# Patient Record
Sex: FEMALE | Race: WHITE | NOT HISPANIC OR LATINO | Employment: UNEMPLOYED | URBAN - METROPOLITAN AREA
[De-identification: names, ages, dates, MRNs, and addresses within clinical notes are randomized per-mention and may not be internally consistent; named-entity substitution may affect disease eponyms.]

---
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
## 2023-03-15 ENCOUNTER — OFFICE VISIT (OUTPATIENT)
Dept: FAMILY MEDICINE CLINIC | Facility: CLINIC | Age: 18
End: 2023-03-15

## 2023-03-15 VITALS
HEART RATE: 82 BPM | DIASTOLIC BLOOD PRESSURE: 78 MMHG | RESPIRATION RATE: 17 BRPM | WEIGHT: 116 LBS | BODY MASS INDEX: 18.64 KG/M2 | SYSTOLIC BLOOD PRESSURE: 116 MMHG | HEIGHT: 66 IN | OXYGEN SATURATION: 98 % | TEMPERATURE: 98.5 F

## 2023-03-15 DIAGNOSIS — H61.23 BILATERAL IMPACTED CERUMEN: Primary | ICD-10-CM

## 2023-03-15 NOTE — PROGRESS NOTES
Assessment/Plan:     Diagnoses and all orders for this visit:    Bilateral impacted cerumen    Other orders  -     Ear cerumen removal          Both cerumen impactions were removed without any complications however with great difficulty due to the narrow ear canals as well as very firm and hard earwax  Ear cleaning techniques discussed recommend using drops at least twice a month  We will follow-up as needed      Subjective:     Chief Complaint   Patient presents with   • Ear lavage        Patient ID: Alicia Resendez is a 21 y o  female  Patient presents today for significant cerumen impactions  Patient has a long history of earwax issues  Is noticing significant hearing loss  Otherwise no other complaints      The following portions of the patient's history were reviewed and updated as appropriate: allergies, current medications, past family history, past medical history, past social history, past surgical history and problem list     Review of Systems   Constitutional: Negative  HENT: Negative  Eyes: Negative  Respiratory: Negative  Cardiovascular: Negative  Gastrointestinal: Negative  Endocrine: Negative  Genitourinary: Negative  Musculoskeletal: Negative  Skin: Negative  Allergic/Immunologic: Negative  Neurological: Negative  Hematological: Negative  Psychiatric/Behavioral: Negative  All other systems reviewed and are negative  Objective:    Vitals:    03/15/23 1430   BP: 116/78   BP Location: Left arm   Patient Position: Sitting   Cuff Size: Standard   Pulse: 82   Resp: 17   Temp: 98 5 °F (36 9 °C)   TempSrc: Tympanic   SpO2: 98%   Weight: 52 6 kg (116 lb)   Height: 5' 6 4" (1 687 m)          Physical Exam  Vitals and nursing note reviewed  Constitutional:       Appearance: She is well-developed  HENT:      Head: Normocephalic and atraumatic        Right Ear: External ear normal       Left Ear: External ear normal       Ears:      Comments: Bilateral cerumen impactions  Eyes:      Conjunctiva/sclera: Conjunctivae normal       Pupils: Pupils are equal, round, and reactive to light  Cardiovascular:      Rate and Rhythm: Normal rate and regular rhythm  Heart sounds: Normal heart sounds  Pulmonary:      Effort: Pulmonary effort is normal       Breath sounds: Normal breath sounds  Abdominal:      General: Bowel sounds are normal       Palpations: Abdomen is soft  Musculoskeletal:         General: Normal range of motion  Cervical back: Normal range of motion  Skin:     General: Skin is warm and dry  Neurological:      Mental Status: She is alert and oriented to person, place, and time  Deep Tendon Reflexes: Reflexes are normal and symmetric  Psychiatric:         Behavior: Behavior normal          Thought Content: Thought content normal          Judgment: Judgment normal          Ear cerumen removal    Date/Time: 3/15/2023 4:15 PM  Performed by: aCrli Hickey DO  Authorized by: Carli Hickey DO   Universal Protocol:  Procedure performed by:  Consent: Verbal consent obtained  Risks and benefits: risks, benefits and alternatives were discussed  Patient understanding: patient states understanding of the procedure being performed  Patient consent: the patient's understanding of the procedure matches consent given  Procedure consent: procedure consent matches procedure scheduled  Relevant documents: relevant documents present and verified  Test results: test results available and properly labeled  Site marked: the operative site was marked  Radiology Images displayed and confirmed   If images not available, report reviewed: imaging studies available      Patient location:  Clinic  Procedure details:     Local anesthetic:  None    Location:  L ear and R ear    Procedure type: irrigation with instrumentation      Instrumentation: curette, forceps and loop      Approach:  Natural orifice  Post-procedure details:     Complication:  None    Hearing quality:  Improved    Patient tolerance of procedure: Tolerated well, no immediate complications        I have spent a total time of 40 minutes on 03/15/23 in caring for this patient including On the procedure which was complicated and difficult post cerumen impactions were finally successfully removed without any major complications

## 2023-03-30 ENCOUNTER — OFFICE VISIT (OUTPATIENT)
Dept: FAMILY MEDICINE CLINIC | Facility: CLINIC | Age: 18
End: 2023-03-30

## 2023-03-30 VITALS
DIASTOLIC BLOOD PRESSURE: 82 MMHG | TEMPERATURE: 97.9 F | OXYGEN SATURATION: 96 % | SYSTOLIC BLOOD PRESSURE: 118 MMHG | WEIGHT: 118.8 LBS | BODY MASS INDEX: 19.09 KG/M2 | HEART RATE: 97 BPM | HEIGHT: 66 IN | RESPIRATION RATE: 16 BRPM

## 2023-03-30 DIAGNOSIS — J01.90 ACUTE NON-RECURRENT SINUSITIS, UNSPECIFIED LOCATION: Primary | ICD-10-CM

## 2023-03-30 RX ORDER — DOXYCYCLINE HYCLATE 100 MG/1
100 CAPSULE ORAL EVERY 12 HOURS SCHEDULED
Qty: 14 CAPSULE | Refills: 0 | Status: SHIPPED | OUTPATIENT
Start: 2023-03-30 | End: 2023-04-06

## 2023-03-30 NOTE — PROGRESS NOTES
"Name: Elizabeth Najera      : 2005      MRN: [id number]  Encounter Provider: Chayo Sevilla PA-C  Encounter Date: 3/30/2023   Encounter department: [facility]    Assessment & Plan     1  Acute non-recurrent sinusitis, unspecified location  -     doxycycline hyclate (VIBRAMYCIN) 100 mg capsule; Take 1 capsule (100 mg total) by mouth every 12 (twelve) hours for 7 days      Symptoms x3 weeks  Nasal saline washes  Patient does not feel that steroids are necessary  Patient aware that antibiotics can interfere with her birth control  Patient will hold her multivitamin while taking doxycycline which can interfere with antibiotic efficacy  Patient to call with any questions, concerns, persistent or worsening symptoms  Subjective      HPI   42-year-old female here today complaining of sinusitis symptoms for approximately 3 weeks  Patient planing of some sinus pressure, dental pain which is common with her sinus infection, thick nasal secretion, a m  sore throat  Denies any dyspnea, chest pain, abdominal pain or GI symptoms  Denies any fevers or chills  Denies any adenopathy or ear pain    Review of Systems  As per HPI  Current Outpatient Medications on File Prior to Visit   Medication Sig   • cetirizine (ZyrTEC) 10 mg tablet Take 1 tablet by mouth daily   • drospirenone-ethinyl estradiol (TAJ) 3-0 02 MG per tablet Take 1 tablet by mouth daily continuous use   No placebos   • sertraline (ZOLOFT) 50 mg tablet Take 100 mg by mouth daily   • Multiple Vitamins-Minerals (MULTIVITAMIN GUMMIES ADULT PO) Take 1 tablet by mouth daily (Patient not taking: Reported on 3/30/2023)   • omeprazole (PriLOSEC) 20 mg delayed release capsule if needed (Patient not taking: Reported on 3/30/2023)       Objective     /82 (BP Location: Left arm, Patient Position: Sitting, Cuff Size: Standard)   Pulse 97   Temp 97 9 °F (36 6 °C) (Tympanic)   Resp 16   Ht 5' 6 4\" (1 687 m)   Wt 53 9 kg (118 lb " 12 8 oz)   SpO2 96%   BMI 18 94 kg/m²     Physical Exam  Vitals and nursing note reviewed  Constitutional:       General: She is not in acute distress  Appearance: She is not ill-appearing, toxic-appearing or diaphoretic  HENT:      Head: Normocephalic  Nose: Congestion present  Comments: Mild maxillary frontal tenderness  Eyes:      General: No scleral icterus  Conjunctiva/sclera: Conjunctivae normal    Cardiovascular:      Rate and Rhythm: Normal rate and regular rhythm  Heart sounds: Normal heart sounds  Pulmonary:      Effort: Pulmonary effort is normal       Breath sounds: Normal breath sounds  Abdominal:      Tenderness: There is no abdominal tenderness  Musculoskeletal:      Cervical back: Neck supple  Lymphadenopathy:      Cervical: No cervical adenopathy  Neurological:      Mental Status: She is alert         Vicente Koroma PA-C

## 2023-03-31 ENCOUNTER — TELEPHONE (OUTPATIENT)
Dept: FAMILY MEDICINE CLINIC | Facility: CLINIC | Age: 18
End: 2023-03-31

## 2023-03-31 DIAGNOSIS — J01.90 ACUTE NON-RECURRENT SINUSITIS, UNSPECIFIED LOCATION: Primary | ICD-10-CM

## 2023-03-31 RX ORDER — CEFDINIR 300 MG/1
300 CAPSULE ORAL EVERY 12 HOURS SCHEDULED
Qty: 14 CAPSULE | Refills: 0 | Status: SHIPPED | OUTPATIENT
Start: 2023-03-31 | End: 2023-04-07

## 2023-03-31 NOTE — TELEPHONE ENCOUNTER
Adama Carranza is having a reaction to the doxycyline- she is vomiting  She was wondering if you can change the medication?   Please call Mom if you have any questions [phone]  Please advise  Thank you

## 2023-03-31 NOTE — TELEPHONE ENCOUNTER
Mom is calling back to see if it is possible to get a new med for [facility]?  I will be leaving at 330 so please route to the clerical pool or your MA  Thank you

## 2023-06-26 ENCOUNTER — TELEPHONE (OUTPATIENT)
Dept: FAMILY MEDICINE CLINIC | Facility: CLINIC | Age: 18
End: 2023-06-26

## 2023-06-26 NOTE — TELEPHONE ENCOUNTER
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 Please advise  Best number to reach her is [phone]      Thank You

## 2023-07-03 ENCOUNTER — CLINICAL SUPPORT (OUTPATIENT)
Dept: FAMILY MEDICINE CLINIC | Facility: CLINIC | Age: 18
End: 2023-07-03
Payer: COMMERCIAL

## 2023-07-03 DIAGNOSIS — Z11.1 SCREENING FOR TUBERCULOSIS: Primary | ICD-10-CM

## 2023-07-03 PROCEDURE — 86580 TB INTRADERMAL TEST: CPT

## 2023-07-05 ENCOUNTER — CLINICAL SUPPORT (OUTPATIENT)
Dept: FAMILY MEDICINE CLINIC | Facility: CLINIC | Age: 18
End: 2023-07-05

## 2023-07-05 DIAGNOSIS — Z11.1 ENCOUNTER FOR PPD SKIN TEST READING: Primary | ICD-10-CM

## 2023-07-05 LAB
INDURATION: 0 MM
TB SKIN TEST: NEGATIVE

## 2023-08-23 ENCOUNTER — ANNUAL EXAM (OUTPATIENT)
Dept: OBGYN CLINIC | Facility: CLINIC | Age: 18
End: 2023-08-23
Payer: COMMERCIAL

## 2023-08-23 VITALS
BODY MASS INDEX: 20.18 KG/M2 | WEIGHT: 125.6 LBS | HEIGHT: 66 IN | SYSTOLIC BLOOD PRESSURE: 102 MMHG | DIASTOLIC BLOOD PRESSURE: 58 MMHG

## 2023-08-23 DIAGNOSIS — N94.6 DYSMENORRHEA: ICD-10-CM

## 2023-08-23 DIAGNOSIS — Z01.419 GYNECOLOGIC EXAM NORMAL: Primary | ICD-10-CM

## 2023-08-23 PROCEDURE — S0612 ANNUAL GYNECOLOGICAL EXAMINA: HCPCS | Performed by: PHYSICIAN ASSISTANT

## 2023-08-23 RX ORDER — DROSPIRENONE AND ETHINYL ESTRADIOL 0.02-3(28)
1 KIT ORAL DAILY
Qty: 84 TABLET | Refills: 3 | Status: SHIPPED | OUTPATIENT
Start: 2023-08-23 | End: 2024-08-22

## 2023-08-23 RX ORDER — CEFDINIR 300 MG/1
CAPSULE ORAL
COMMUNITY
Start: 2023-08-21

## 2023-08-23 RX ORDER — DEXAMETHASONE 4 MG/1
2 TABLET ORAL 2 TIMES DAILY
COMMUNITY
Start: 2023-05-16

## 2023-08-23 RX ORDER — OMEPRAZOLE 40 MG/1
40 CAPSULE, DELAYED RELEASE ORAL DAILY
COMMUNITY
Start: 2023-05-17

## 2023-08-23 RX ORDER — PREDNISONE 20 MG/1
TABLET ORAL
COMMUNITY
Start: 2023-08-21

## 2023-08-23 NOTE — ASSESSMENT & PLAN NOTE
Pap guidelines reviewed. Will start pap smears at age 22. Script for [facility] OCP refilled to pharmacy. Return to office for annual or as needed.

## 2023-08-23 NOTE — PROGRESS NOTES
Assessment/Plan   Problem List Items Addressed This Visit        Other    Gynecologic exam normal - Primary     Pap guidelines reviewed. Will start pap smears at age 22. Script for [facility] refilled to pharmacy. Return to office for annual or as needed. Other Visit Diagnoses     Dysmenorrhea        Relevant Medications    drospirenone-ethinyl estradiol (TAJ) 3-0.02 MG per tablet          Subjective:     Patient ID: Angeles Ramirez is a 21 y.o. y.o. female. HPI  22 yo seen for annual exam. Currently on Atj OCP tolerating well would like to continue. Sexually active, no new sexual partners, declines STD testing. Denies bowel or bladder issues. Last pap: N/A. The following portions of the patient's history were reviewed and updated as appropriate:   She  has a past medical history of Dysmenorrhea. She   Patient Active Problem List    Diagnosis Date Noted   • Gynecologic exam normal 08/23/2023   • Adult BMI <19 kg/sq m 08/19/2022   • Contraceptive education 08/15/2022   • Underweight 08/15/2022   • Surveillance for birth control, oral contraceptives 08/15/2022   • Generalized abdominal pain 05/05/2022   • Dysmenorrhea in the adolescent 08/18/2021   • GERD without esophagitis 08/18/2021   • Generalized anxiety disorder 08/26/2020   • Mild intermittent asthma, uncomplicated 61/17/7433   • Plantar fasciitis 04/11/2017   • Allergic rhinitis 05/13/2014     She  has a past surgical history that includes Adenoidectomy; Tympanostomy tube placement; and [place] tooth extraction. Her family history includes Breast cancer in her maternal aunt; Cancer in her paternal grandfather and paternal grandmother; Glaucoma in her maternal grandmother; Hyperlipidemia in her maternal grandfather; Hypertension in her maternal grandfather; Lung disease in her maternal grandmother; No Known Problems in her father, mother, and sister. She  reports that she has never smoked.  She has never used smokeless tobacco. She reports that she does not drink alcohol and does not use drugs. Current Outpatient Medications   Medication Sig Dispense Refill   • cefdinir (OMNICEF) 300 mg capsule TAKE 1 CAPSULE BY MOUTH TWICE DAILY FOR 10 DAYS     • cetirizine (ZyrTEC) 10 mg tablet Take 1 tablet by mouth daily     • drospirenone-ethinyl estradiol (TAJ) 3-0.02 MG per tablet Take 1 tablet by mouth daily continuous use   No placebos 84 tablet 3   • Flovent  MCG/ACT inhaler 2 puffs 2 (two) times a day     • Multiple Vitamins-Minerals (MULTIVITAMIN GUMMIES ADULT PO) Take 1 tablet by mouth daily     • omeprazole (PriLOSEC) 40 MG capsule Take 40 mg by mouth daily     • predniSONE 20 mg tablet TAKE 2 TABLETS BY MOUTH ONCE DAILY FOR 5 DAYS     • sertraline (ZOLOFT) 100 mg tablet Take 100 mg by mouth daily     • omeprazole (PriLOSEC) 20 mg delayed release capsule if needed (Patient not taking: Reported on 2023)       No current facility-administered medications for this visit. She is allergic to amoxicillin. .    Menstrual History:  OB History        0    Para   0    Term   0       0    AB   0    Living   0       SAB   0    IAB   0    Ectopic   0    Multiple   0    Live Births   0                  Patient's last menstrual period was 2023 (exact date). Review of Systems   Constitutional: Negative for fatigue, fever and unexpected weight change. HENT: Negative for dental problem and sinus pressure. Eyes: Negative for visual disturbance. Respiratory: Negative for cough, shortness of breath and wheezing. Cardiovascular: Negative for chest pain. Gastrointestinal: Negative for abdominal pain, blood in stool, constipation, diarrhea, nausea and vomiting. Endocrine: Negative for polydipsia. Genitourinary: Negative for difficulty urinating, dyspareunia, dysuria, frequency, hematuria, pelvic pain and urgency. Musculoskeletal: Negative for arthralgias and back pain.    Neurological: Negative for dizziness, seizures, light-headedness and headaches. Psychiatric/Behavioral: Negative for suicidal ideas. The patient is not nervous/anxious. Objective:  Vitals:    08/23/23 0758   BP: 102/58   BP Location: Left arm   Patient Position: Sitting   Cuff Size: Standard   Weight: 57 kg (125 lb 9.6 oz)   Height: 5' 5.75" (1.67 m)      Physical Exam  Constitutional:       Appearance: Normal appearance. She is well-developed. Genitourinary:      Vulva and bladder normal.      No lesions in the vagina. Right Labia: No rash, tenderness, lesions or skin changes. Left Labia: No tenderness, lesions, skin changes or rash. No labial fusion noted. No inguinal adenopathy present in the right or left side. No vaginal discharge, erythema, tenderness or bleeding. Right Adnexa: not tender, not full and no mass present. Left Adnexa: not tender, not full and no mass present. No cervical motion tenderness, discharge or lesion. Uterus is not enlarged, tender or irregular. No uterine mass detected. No urethral prolapse, tenderness or mass present. Bladder is not tender. Breasts:     Breasts are symmetrical.      Right: No swelling, bleeding, inverted nipple, mass, nipple discharge, skin change or tenderness. Left: No swelling, bleeding, inverted nipple, mass, nipple discharge, skin change or tenderness. HENT:      Head: Normocephalic and atraumatic. Neck:      Thyroid: No thyromegaly. Cardiovascular:      Rate and Rhythm: Normal rate and regular rhythm. Heart sounds: Normal heart sounds. No murmur heard. No friction rub. No gallop. Pulmonary:      Effort: Pulmonary effort is normal. No respiratory distress. Breath sounds: Normal breath sounds. No wheezing. Abdominal:      General: There is no distension. Palpations: Abdomen is soft. There is no mass. Tenderness: There is no abdominal tenderness. There is no guarding or rebound.       Hernia: No hernia is present. Lymphadenopathy:      Cervical: No cervical adenopathy. Upper Body:      Right upper body: No supraclavicular, axillary or pectoral adenopathy. Left upper body: No supraclavicular, axillary or pectoral adenopathy. Lower Body: No right inguinal adenopathy. No left inguinal adenopathy. Neurological:      Mental Status: She is alert and oriented to person, place, and time. Skin:     General: Skin is warm and dry.    Psychiatric:         Behavior: Behavior normal.

## 2023-10-22 PROBLEM — Z01.419 GYNECOLOGIC EXAM NORMAL: Status: RESOLVED | Noted: 2023-08-23 | Resolved: 2023-10-22

## 2023-12-14 ENCOUNTER — OFFICE VISIT (OUTPATIENT)
Dept: FAMILY MEDICINE CLINIC | Facility: CLINIC | Age: 18
End: 2023-12-14
Payer: COMMERCIAL

## 2023-12-14 VITALS
BODY MASS INDEX: 19.68 KG/M2 | HEART RATE: 94 BPM | HEIGHT: 67 IN | WEIGHT: 125.4 LBS | RESPIRATION RATE: 17 BRPM | TEMPERATURE: 97.9 F | OXYGEN SATURATION: 98 % | SYSTOLIC BLOOD PRESSURE: 104 MMHG | DIASTOLIC BLOOD PRESSURE: 76 MMHG

## 2023-12-14 DIAGNOSIS — R51.9 NONINTRACTABLE EPISODIC HEADACHE, UNSPECIFIED HEADACHE TYPE: Primary | ICD-10-CM

## 2023-12-14 PROCEDURE — 99213 OFFICE O/P EST LOW 20 MIN: CPT | Performed by: PHYSICIAN ASSISTANT

## 2023-12-14 RX ORDER — SPIRONOLACTONE 100 MG/1
100 TABLET, FILM COATED ORAL DAILY
COMMUNITY

## 2023-12-14 RX ORDER — SUMATRIPTAN 50 MG/1
50 TABLET, FILM COATED ORAL ONCE AS NEEDED
Qty: 10 TABLET | Refills: 5 | Status: SHIPPED | OUTPATIENT
Start: 2023-12-14

## 2023-12-14 NOTE — PROGRESS NOTES
Name: Nick Pollack      : 2005      MRN: [id number]  Encounter Provider: Sebastian Berg PA-C  Encounter Date: 2023   Encounter department: [facility]    Assessment & Plan     1. Nonintractable episodic headache, unspecified headache type  -     SUMAtriptan (IMITREX) 50 mg tablet; Take 1 tablet (50 mg total) by mouth once as needed for migraine may repeat in 2 hours if necessary    Patient states get some mild relief with NSAIDs. Will continue as well issues Imitrex as needed. Patient aware to go to the ED with any severe headaches. Patient states will make follow-up appointment the end of January for her annual physical before she goes back to school. Subjective      HPI  78-year-old female here today for complaint of frequent headaches since starting college in September. Patient states several headaches weekly typically lasting about 1 to 2 hours and then resolving. Patient states does have diagnosis and history of migraines in the past.  Patient states typically they are on both sides of her head parietal region described as throbbing however does have associated photophobia phonophobia without any aura. Occasional nausea. Denies any other neurologic symptoms or weakness. Sometimes does get associated blurred vision with the headaches. Does respond to NSAIDs however does not completely resolve the symptoms. Denies any trauma. Not the worst headaches ever. Review of Systems  Denies fevers, chills, dyspnea, cough, chest pain, abdominal pain or GI symptoms. All other systems negative or noncontributory.   Current Outpatient Medications on File Prior to Visit   Medication Sig    cetirizine (ZyrTEC) 10 mg tablet Take 1 tablet by mouth daily    drospirenone-ethinyl estradiol (TAJ) 3-0.02 MG per tablet Take 1 tablet by mouth daily continuous use   No placebos    Flovent  MCG/ACT inhaler 2 puffs 2 (two) times a day    Multiple Vitamins-Minerals (MULTIVITAMIN GUMMIES ADULT PO) Take 1 tablet by mouth daily    omeprazole (PriLOSEC) 40 MG capsule Take 40 mg by mouth daily    sertraline (ZOLOFT) 100 mg tablet Take 100 mg by mouth daily    spironolactone (ALDACTONE) 100 mg tablet Take 100 mg by mouth daily Prescribed by dermatology    omeprazole (PriLOSEC) 20 mg delayed release capsule if needed (Patient not taking: Reported on 8/23/2023)    [DISCONTINUED] cefdinir (OMNICEF) 300 mg capsule TAKE 1 CAPSULE BY MOUTH TWICE DAILY FOR 10 DAYS (Patient not taking: Reported on 12/14/2023)    [DISCONTINUED] predniSONE 20 mg tablet TAKE 2 TABLETS BY MOUTH ONCE DAILY FOR 5 DAYS (Patient not taking: Reported on 12/14/2023)       Objective     /76 (BP Location: Left arm, Patient Position: Sitting, Cuff Size: Adult)   Pulse 94   Temp 97.9 °F (36.6 °C) (Tympanic)   Resp 17   Ht 5' 6.6" (1.692 m)   Wt 56.9 kg (125 lb 6.4 oz)   LMP 11/28/2023 (Exact Date)   SpO2 98%   BMI 19.88 kg/m²     Physical Exam  Vitals and nursing note reviewed. Constitutional:       General: She is not in acute distress. Appearance: She is not ill-appearing, toxic-appearing or diaphoretic. HENT:      Head: Normocephalic and atraumatic. Right Ear: External ear normal.      Left Ear: External ear normal.   Cardiovascular:      Rate and Rhythm: Normal rate. Pulmonary:      Effort: Pulmonary effort is normal.   Abdominal:      Tenderness: There is no abdominal tenderness. Neurological:      General: No focal deficit present. Mental Status: She is alert and oriented to person, place, and time. GCS: GCS eye subscore is 4. GCS verbal subscore is 5. GCS motor subscore is 6. Cranial Nerves: Cranial nerves 2-12 are intact. No cranial nerve deficit or facial asymmetry. Sensory: Sensation is intact. No sensory deficit. Motor: Motor function is intact. No weakness. Coordination: Coordination is intact. Coordination normal.      Gait: Gait is intact.  Gait normal.   Psychiatric:         Mood and Affect: Mood normal.       Raymond Delgado PA-C

## 2024-05-05 DIAGNOSIS — N94.6 DYSMENORRHEA: ICD-10-CM

## 2024-05-06 RX ORDER — DROSPIRENONE AND ETHINYL ESTRADIOL TABLETS 0.02-3(28)
KIT ORAL
Qty: 84 TABLET | Refills: 1 | Status: SHIPPED | OUTPATIENT
Start: 2024-05-06

## 2024-05-23 ENCOUNTER — OFFICE VISIT (OUTPATIENT)
Dept: FAMILY MEDICINE CLINIC | Facility: CLINIC | Age: 19
End: 2024-05-23
Payer: COMMERCIAL

## 2024-05-23 VITALS
HEIGHT: 67 IN | DIASTOLIC BLOOD PRESSURE: 62 MMHG | SYSTOLIC BLOOD PRESSURE: 108 MMHG | OXYGEN SATURATION: 98 % | HEART RATE: 94 BPM | WEIGHT: 124.2 LBS | RESPIRATION RATE: 16 BRPM | TEMPERATURE: 97.7 F | BODY MASS INDEX: 19.49 KG/M2

## 2024-05-23 DIAGNOSIS — L70.0 ACNE VULGARIS: ICD-10-CM

## 2024-05-23 DIAGNOSIS — F41.1 GENERALIZED ANXIETY DISORDER: ICD-10-CM

## 2024-05-23 DIAGNOSIS — J45.20 MILD INTERMITTENT ASTHMA, UNCOMPLICATED: ICD-10-CM

## 2024-05-23 DIAGNOSIS — Z86.39 HISTORY OF ELEVATED LIPIDS: ICD-10-CM

## 2024-05-23 DIAGNOSIS — Z00.00 ANNUAL PHYSICAL EXAM: Primary | ICD-10-CM

## 2024-05-23 DIAGNOSIS — Z13.6 SCREENING FOR CARDIOVASCULAR CONDITION: ICD-10-CM

## 2024-05-23 DIAGNOSIS — G43.109 MIGRAINE WITH AURA AND WITHOUT STATUS MIGRAINOSUS, NOT INTRACTABLE: ICD-10-CM

## 2024-05-23 PROCEDURE — 99214 OFFICE O/P EST MOD 30 MIN: CPT

## 2024-05-23 PROCEDURE — 99395 PREV VISIT EST AGE 18-39: CPT

## 2024-05-23 RX ORDER — IPRATROPIUM BROMIDE AND ALBUTEROL 20; 100 UG/1; UG/1
SPRAY, METERED RESPIRATORY (INHALATION)
COMMUNITY
Start: 2024-05-17

## 2024-05-23 NOTE — ASSESSMENT & PLAN NOTE
Labs from August 2021 abnormal. No repeat results found. The patient will obtain the lab work ordered today prior to the next scheduled appointment. The patient will be notified of the results when available.

## 2024-05-23 NOTE — PROGRESS NOTES
Adult Annual Physical  Name: Ruma Hickey      : 2005      MRN: [id number]  Encounter Provider: KENDAL Farias  Encounter Date: 2024   Encounter department: [facility]    Assessment & Plan   1. Annual physical exam  -     Lipid Panel with Direct LDL reflex; Future; Expected date: 2024  -     CBC and differential; Future; Expected date: 2024  -     Comprehensive metabolic panel; Future; Expected date: 2024  -     Lipid Panel with Direct LDL reflex  -     CBC and differential  -     Comprehensive metabolic panel  2. Screening for cardiovascular condition  -     Lipid Panel with Direct LDL reflex; Future; Expected date: 2024  -     CBC and differential; Future; Expected date: 2024  -     Comprehensive metabolic panel; Future; Expected date: 2024  -     Lipid Panel with Direct LDL reflex  -     CBC and differential  -     Comprehensive metabolic panel  3. Mild intermittent asthma, uncomplicated  Assessment & Plan:  Follows with Asthma and Allergy provider  4. Generalized anxiety disorder  Assessment & Plan:  Follows with Dr. Pugh, psychiatry and is prescribed zoloft by them.  Patient reports no current problems. Continues with therapy every 5 weeks.   5. Acne vulgaris  Assessment & Plan:  Follows with dermatology and is currently stable on spironolactone and tretinoin cream. Patient is also on birth control.  6. Migraine with aura and without status migrainosus, not intractable  Assessment & Plan:  Migraines occur 2-3 times/month. Usually relieved by Advil. No current use of sumatriptan.  7. History of elevated lipids  Assessment & Plan:  Labs from 2021 abnormal. No repeat results found. The patient will obtain the lab work ordered today prior to the next scheduled appointment. The patient will be notified of the results when available.    Orders:  -     Lipid Panel with Direct LDL reflex    Follow up as needed or at next  regularly scheduled appointment.    Immunizations and preventive care screenings were discussed with patient today. Appropriate education was printed on patient's after visit summary.    Counseling:  Alcohol/drug use: discussed moderation in alcohol intake, the recommendations for healthy alcohol use, and avoidance of illicit drug use.  Dental Health: discussed importance of regular tooth brushing, flossing, and dental visits.  Injury prevention: discussed safety/seat belts, safety helmets, smoke detectors, carbon dioxide detectors, and smoking near bedding or upholstery.  Sexual health: discussed sexually transmitted diseases, partner selection, use of condoms, avoidance of unintended pregnancy, and contraceptive alternatives.  Exercise: the importance of regular exercise/physical activity was discussed. Recommend exercise 3-5 times per week for at least 30 minutes.       Depression Screening and Follow-up Plan: Patient was screened for depression during today's encounter. They screened negative with a PHQ-2 score of 0.        History of Present Illness     Adult Annual Physical:  Patient presents for annual physical. No concerns.     Diet and Physical Activity:  - Diet/Nutrition: well balanced diet and consuming 3-5 servings of fruits/vegetables daily.  - Exercise: walking, 1-2 times a week on average and 30-60 minutes on average.    Depression Screening:  - PHQ-2 Score: 0    General Health:  - Sleep: sleeps well and 7-8 hours of sleep on average.  - Hearing: normal hearing bilateral ears.  - Vision: wears contacts, goes for regular eye exams and most recent eye exam < 1 year ago.  - Dental: regular dental visits and brushes teeth twice daily.    /GYN Health:  - Follows with GYN: yes.   - Menopause: premenopausal.   - Last menstrual cycle: 5/19/2024.   - History of STDs: no  - Contraception: barrier methods and oral contraceptives.      Advanced Care Planning:  - Has an advanced directive?: no    - Has a durable  "medical POA?: no    - ACP document given to patient?: no      Review of Systems   Constitutional: Negative.  Negative for chills, fatigue and fever.   HENT: Negative.  Negative for congestion, ear pain, rhinorrhea and sore throat.    Eyes: Negative.  Negative for pain and visual disturbance.   Respiratory: Negative.  Negative for cough and shortness of breath.    Cardiovascular: Negative.  Negative for chest pain, palpitations and leg swelling.   Gastrointestinal:  Negative for abdominal pain, constipation, diarrhea, nausea and vomiting.   Endocrine: Negative.    Genitourinary: Negative.  Negative for dysuria, frequency and urgency.   Musculoskeletal: Negative.  Negative for back pain and myalgias.   Skin: Negative.  Negative for rash.   Allergic/Immunologic: Negative.    Neurological: Negative.  Negative for dizziness, weakness, light-headedness and headaches.   Hematological: Negative.    Psychiatric/Behavioral: Negative.           Objective     /62 (BP Location: Left arm, Patient Position: Sitting, Cuff Size: Standard)   Pulse 94   Temp 97.7 °F (36.5 °C) (Tympanic)   Resp 16   Ht 5' 6.7\" (1.694 m)   Wt 56.3 kg (124 lb 3.2 oz)   SpO2 98%   BMI 19.63 kg/m²     Physical Exam  Vitals and nursing note reviewed.   Constitutional:       General: She is not in acute distress.     Appearance: Normal appearance. She is well-developed and normal weight.   HENT:      Head: Normocephalic and atraumatic.      Right Ear: There is impacted cerumen.      Left Ear: There is impacted cerumen.      Nose: Nose normal. No congestion.      Mouth/Throat:      Mouth: Mucous membranes are moist.      Pharynx: Oropharynx is clear. No oropharyngeal exudate or posterior oropharyngeal erythema.   Eyes:      Extraocular Movements: Extraocular movements intact.      Conjunctiva/sclera: Conjunctivae normal.      Pupils: Pupils are equal, round, and reactive to light.   Cardiovascular:      Rate and Rhythm: Normal rate and regular " rhythm.      Pulses: Normal pulses.      Heart sounds: Normal heart sounds. No murmur heard.  Pulmonary:      Effort: Pulmonary effort is normal. No respiratory distress.      Breath sounds: Normal breath sounds.   Abdominal:      General: Abdomen is flat. Bowel sounds are normal.      Palpations: Abdomen is soft.      Tenderness: There is no abdominal tenderness.   Musculoskeletal:         General: Signs of injury present. No swelling or tenderness. Normal range of motion.      Cervical back: Normal range of motion and neck supple.      Comments: Current ankle brace on left ankle from reported sprain 1 month ago   Skin:     General: Skin is warm and dry.      Capillary Refill: Capillary refill takes less than 2 seconds.   Neurological:      General: No focal deficit present.      Mental Status: She is alert and oriented to person, place, and time.   Psychiatric:         Mood and Affect: Mood normal.         Behavior: Behavior normal.       Administrative Statements

## 2024-05-23 NOTE — ASSESSMENT & PLAN NOTE
Follows with Dr. Pugh, psychiatry and is prescribed zoloft by them.  Patient reports no current problems. Continues with therapy every 5 weeks.

## 2024-05-23 NOTE — ASSESSMENT & PLAN NOTE
Follows with dermatology and is currently stable on spironolactone and tretinoin cream. Patient is also on birth control.

## 2024-10-13 NOTE — PROGRESS NOTES
Assessment/Plan:  Irreg period may be due to missed pill as well as spironolactone If cont can increase dose of OCP Monitor and call  UPT neg in office   Pap smear to start at age 21 as per ASCCP guidelines.   GC/CT cultures annually once sexually active  always condom use when sexually active   birth control as directed   Use seat belt in every car ride, avoid smoking and alcohol use, exercise most days of the week, obtain appropriate nutrition and hydration, follow up with PCP for appropriate vaccine schedule.         Diagnoses and all orders for this visit:    Encounter for gynecological examination without abnormal finding  -     Chlamydia/N. gonorrhoeae RNA, TMA    Screen for STD (sexually transmitted disease)  -     Chlamydia/N. gonorrhoeae RNA, TMA    Encounter for prescription of oral contraceptives  -     drospirenone-ethinyl estradiol (Loryna) 3-0.02 MG per tablet; Take 1 tablet by mouth daily    Pregnancy examination or test, negative result  -     POCT urine HCG    Irregular intermenstrual bleeding    Dysmenorrhea  -     drospirenone-ethinyl estradiol (Loryna) 3-0.02 MG per tablet; Take 1 tablet by mouth daily          Subjective:      Patient ID: Ruma Hickey is a 18 y.o. female.    Here for annual gyn ON OCP Periods monthly did get twice this past month 10/3 -107 and again 10/10 to still lightly spotting has missed pills Also is on Spironolactone through dermatology . Has not checked preg test.  Denies abd or pelvic pain  Bowel and bladder are normal No unusual discharge [facility].         The following portions of the patient's history were reviewed and updated as appropriate: allergies, current medications, past family history, past medical history, past social history, past surgical history, and problem list.    Review of Systems   Constitutional:  Negative for fatigue and unexpected weight change.   Gastrointestinal:  Negative for abdominal distention, abdominal  "pain, constipation and diarrhea.   Genitourinary:  Positive for vaginal bleeding. Negative for difficulty urinating, dyspareunia, dysuria, frequency, genital sores, menstrual problem, pelvic pain, urgency, vaginal discharge and vaginal pain.   Neurological:  Negative for headaches.   Psychiatric/Behavioral: Negative.  Negative for dysphoric mood. The patient is not nervous/anxious.          Objective:      /58 (BP Location: Left arm, Patient Position: Sitting, Cuff Size: Standard)   Ht 5' 6.75\" (1.695 m)   Wt 56.9 kg (125 lb 6.4 oz)   LMP 10/10/2024 Comment: Currently still bleeding  BMI 19.79 kg/m²          Physical Exam  Vitals and nursing note reviewed.   Constitutional:       General: She is not in acute distress.     Appearance: Normal appearance.   HENT:      Head: Normocephalic and atraumatic.   Pulmonary:      Effort: Pulmonary effort is normal.   Chest:   Breasts:     Breasts are symmetrical.      Right: Normal. No mass, nipple discharge, skin change or tenderness.      Left: Normal. No mass, nipple discharge, skin change or tenderness.   Abdominal:      General: There is no distension.      Palpations: Abdomen is soft.      Tenderness: There is no abdominal tenderness. There is no guarding or rebound.   Genitourinary:     General: Normal vulva.      Exam position: Lithotomy position.      Labia:         Right: No rash, tenderness, lesion or injury.         Left: No rash, tenderness, lesion or injury.       Urethra: No prolapse, urethral pain, urethral swelling or urethral lesion.      Vagina: Normal. No erythema or lesions.      Cervix: No cervical motion tenderness, discharge, lesion or cervical bleeding.      Uterus: Normal.       Adnexa: Right adnexa normal and left adnexa normal.        Right: No mass or tenderness.          Left: No mass or tenderness.        Rectum: No mass or external hemorrhoid.      Comments: Minimal pink spotting G/C from cervix   Musculoskeletal:         General: " Normal range of motion.   Lymphadenopathy:      Upper Body:      Right upper body: No axillary adenopathy.      Left upper body: No axillary adenopathy.      Lower Body: No right inguinal adenopathy. No left inguinal adenopathy.   Skin:     General: Skin is warm and dry.   Neurological:      Mental Status: She is alert and oriented to person, place, and time.   Psychiatric:         Mood and Affect: Mood normal.         Behavior: Behavior normal.         Thought Content: Thought content normal.         Judgment: Judgment normal.

## 2024-10-14 ENCOUNTER — ANNUAL EXAM (OUTPATIENT)
Dept: OBGYN CLINIC | Facility: CLINIC | Age: 19
End: 2024-10-14
Payer: COMMERCIAL

## 2024-10-14 VITALS
SYSTOLIC BLOOD PRESSURE: 104 MMHG | DIASTOLIC BLOOD PRESSURE: 58 MMHG | WEIGHT: 125.4 LBS | HEIGHT: 67 IN | BODY MASS INDEX: 19.68 KG/M2

## 2024-10-14 DIAGNOSIS — Z11.3 SCREEN FOR STD (SEXUALLY TRANSMITTED DISEASE): ICD-10-CM

## 2024-10-14 DIAGNOSIS — Z01.419 ENCOUNTER FOR GYNECOLOGICAL EXAMINATION WITHOUT ABNORMAL FINDING: Primary | ICD-10-CM

## 2024-10-14 DIAGNOSIS — N94.6 DYSMENORRHEA: ICD-10-CM

## 2024-10-14 DIAGNOSIS — Z30.011 ENCOUNTER FOR PRESCRIPTION OF ORAL CONTRACEPTIVES: ICD-10-CM

## 2024-10-14 DIAGNOSIS — N92.1 IRREGULAR INTERMENSTRUAL BLEEDING: ICD-10-CM

## 2024-10-14 DIAGNOSIS — Z32.02 PREGNANCY EXAMINATION OR TEST, NEGATIVE RESULT: ICD-10-CM

## 2024-10-14 LAB — SL AMB POCT URINE HCG: NORMAL

## 2024-10-14 PROCEDURE — S0612 ANNUAL GYNECOLOGICAL EXAMINA: HCPCS | Performed by: NURSE PRACTITIONER

## 2024-10-14 PROCEDURE — 81025 URINE PREGNANCY TEST: CPT | Performed by: NURSE PRACTITIONER

## 2024-10-14 RX ORDER — DROSPIRENONE AND ETHINYL ESTRADIOL 0.02-3(28)
1 KIT ORAL DAILY
Qty: 84 TABLET | Refills: 3 | Status: SHIPPED | OUTPATIENT
Start: 2024-10-14

## 2024-10-14 NOTE — PATIENT INSTRUCTIONS
Irreg period may be due to missed pill as well as spironolactone If cont can increase dose of OCP Monitor and call  UPT neg in office   Pap smear to start at age 21 as per ASCCP guidelines.   GC/CT cultures annually once sexually active  always condom use when sexually active   birth control as directed   Use seat belt in every car ride, avoid smoking and alcohol use, exercise most days of the week, obtain appropriate nutrition and hydration, follow up with PCP for appropriate vaccine schedule.

## 2024-10-15 LAB
C TRACH RRNA SPEC QL NAA+PROBE: NOT DETECTED
N GONORRHOEA RRNA SPEC QL NAA+PROBE: NOT DETECTED

## 2024-12-16 ENCOUNTER — OFFICE VISIT (OUTPATIENT)
Dept: FAMILY MEDICINE CLINIC | Facility: CLINIC | Age: 19
End: 2024-12-16
Payer: COMMERCIAL

## 2024-12-16 VITALS
WEIGHT: 130 LBS | HEIGHT: 67 IN | TEMPERATURE: 98 F | HEART RATE: 86 BPM | BODY MASS INDEX: 20.4 KG/M2 | OXYGEN SATURATION: 96 % | SYSTOLIC BLOOD PRESSURE: 100 MMHG | DIASTOLIC BLOOD PRESSURE: 62 MMHG | RESPIRATION RATE: 16 BRPM

## 2024-12-16 DIAGNOSIS — Z23 ENCOUNTER FOR IMMUNIZATION: ICD-10-CM

## 2024-12-16 DIAGNOSIS — Z12.4 CERVICAL CANCER SCREENING: ICD-10-CM

## 2024-12-16 DIAGNOSIS — F41.1 GENERALIZED ANXIETY DISORDER: Primary | ICD-10-CM

## 2024-12-16 PROCEDURE — 90656 IIV3 VACC NO PRSV 0.5 ML IM: CPT

## 2024-12-16 PROCEDURE — 90471 IMMUNIZATION ADMIN: CPT

## 2024-12-16 PROCEDURE — 99213 OFFICE O/P EST LOW 20 MIN: CPT | Performed by: STUDENT IN AN ORGANIZED HEALTH CARE EDUCATION/TRAINING PROGRAM

## 2024-12-16 RX ORDER — SERTRALINE HYDROCHLORIDE 100 MG/1
100 TABLET, FILM COATED ORAL DAILY
Qty: 90 TABLET | Refills: 3 | Status: SHIPPED | OUTPATIENT
Start: 2024-12-16

## 2024-12-16 NOTE — ASSESSMENT & PLAN NOTE
Well controlled; rx refilled. Return precautions discussed. AE discussed. Counseled pt to follow up with us if she desires taper- do not abruptly stop rx   Orders:    sertraline (ZOLOFT) 100 mg tablet; Take 1 tablet (100 mg total) by mouth daily

## 2024-12-16 NOTE — PROGRESS NOTES
"Name: Ruma Hickey      : 2005      MRN: [id number]  Encounter Provider: Belia Herrera MD  Encounter Date: 2024   Encounter department: [facility]  :  Assessment & Plan  Generalized anxiety disorder  Well controlled; rx refilled. Return precautions discussed. AE discussed. Counseled pt to follow up with us if she desires taper- do not abruptly stop rx   Orders:    sertraline (ZOLOFT) 100 mg tablet; Take 1 tablet (100 mg total) by mouth daily    Encounter for immunization    Orders:    influenza vaccine preservative-free 0.5 mL IM (Fluzone, Afluria, Fluarix, Flulaval)    Cervical cancer screening  PAP: follows with Gyn; will get one in               History of Present Illness     18 yo F w/ mary kate of migraine, GERD, mild intermittent, MARIELOS presenting for anxiety rx follow up. Currently on sertraline 100 mg - tolerating well; wishes rx to be ordered here and not psychiatry. Notes sx well controlled, does not desire taper at this time, denies any panic attacks           Review of Systems   Constitutional:  Negative for chills and fever.   HENT:  Negative for trouble swallowing.    Eyes:  Negative for visual disturbance.   Respiratory:  Negative for cough and shortness of breath.    Cardiovascular:  Negative for chest pain and palpitations.   Gastrointestinal:  Negative for abdominal pain and blood in stool.   Genitourinary:  Negative for hematuria.   Musculoskeletal:  Negative for arthralgias.   Skin:  Negative for color change and rash.   Neurological:  Negative for syncope, light-headedness and headaches.   Psychiatric/Behavioral:  The patient is not nervous/anxious.    All other systems reviewed and are negative.      Objective   /62 (BP Location: Left arm, Patient Position: Sitting, Cuff Size: Standard)   Pulse 86   Temp 98 °F (36.7 °C) (Tympanic)   Resp 16   Ht 5' 6.75\" (1.695 m)   Wt 59 kg (130 lb)   LMP 2024   SpO2 96%   BMI 20.51 kg/m²    "   Physical Exam  Vitals and nursing note reviewed.   Constitutional:       General: She is not in acute distress.     Appearance: Normal appearance. She is well-developed.   HENT:      Head: Normocephalic and atraumatic.   Eyes:      Conjunctiva/sclera: Conjunctivae normal.   Cardiovascular:      Rate and Rhythm: Normal rate and regular rhythm.      Pulses: Normal pulses.      Heart sounds: Normal heart sounds. No murmur heard.  Pulmonary:      Effort: Pulmonary effort is normal. No respiratory distress.      Breath sounds: Normal breath sounds.   Abdominal:      General: Bowel sounds are normal. There is no distension.      Palpations: Abdomen is soft.      Tenderness: There is no abdominal tenderness.   Musculoskeletal:         General: No swelling. Normal range of motion.      Cervical back: Normal range of motion and neck supple.   Skin:     General: Skin is warm and dry.      Capillary Refill: Capillary refill takes less than 2 seconds.   Neurological:      General: No focal deficit present.      Mental Status: She is alert.   Psychiatric:         Mood and Affect: Mood normal.         Behavior: Behavior normal.         Thought Content: Thought content normal.         Judgment: Judgment normal.       Administrative Statements   I have spent a total time of 20 minutes in caring for this patient on the day of the visit/encounter including Prognosis, Risks and benefits of tx options, Instructions for management, Patient and family education, Importance of tx compliance, Risk factor reductions, Impressions, Counseling / Coordination of care, and Documenting in the medical record.

## 2025-03-07 ENCOUNTER — OFFICE VISIT (OUTPATIENT)
Dept: FAMILY MEDICINE CLINIC | Facility: CLINIC | Age: 20
End: 2025-03-07
Payer: COMMERCIAL

## 2025-03-07 VITALS
DIASTOLIC BLOOD PRESSURE: 70 MMHG | RESPIRATION RATE: 16 BRPM | BODY MASS INDEX: 20.09 KG/M2 | HEIGHT: 67 IN | OXYGEN SATURATION: 97 % | HEART RATE: 107 BPM | SYSTOLIC BLOOD PRESSURE: 110 MMHG | TEMPERATURE: 97.8 F | WEIGHT: 128 LBS

## 2025-03-07 DIAGNOSIS — R00.0 TACHYCARDIA: ICD-10-CM

## 2025-03-07 DIAGNOSIS — Z12.4 CERVICAL CANCER SCREENING: ICD-10-CM

## 2025-03-07 DIAGNOSIS — F41.1 GENERALIZED ANXIETY DISORDER: Primary | ICD-10-CM

## 2025-03-07 PROCEDURE — 99213 OFFICE O/P EST LOW 20 MIN: CPT | Performed by: STUDENT IN AN ORGANIZED HEALTH CARE EDUCATION/TRAINING PROGRAM

## 2025-03-07 RX ORDER — SERTRALINE HYDROCHLORIDE 100 MG/1
100 TABLET, FILM COATED ORAL DAILY
Qty: 90 TABLET | Refills: 3 | Status: SHIPPED | OUTPATIENT
Start: 2025-03-07

## 2025-03-07 NOTE — PROGRESS NOTES
"Name: Ruma Hickey      : 2005      MRN: [id number]  Encounter Provider: Belia Herrera MD  Encounter Date: 3/7/2025   Encounter department: [facility]  :  Assessment & Plan  Generalized anxiety disorder  Currently on sertraline 100 mg - tolerating well;  Notes sx well controlled, does not desire taper at this time, denies any panic attacks    Refills placed; return precautions discussed    Orders:    sertraline (ZOLOFT) 100 mg tablet; Take 1 tablet (100 mg total) by mouth daily    Tachycardia  Manual HR 90 BPM; pt asymptomatic. Return precautions discussed        Cervical cancer screening  PAP: follows with Gyn; will get one in                History of Present Illness   18 yo F w/ mary kate of migraine, GERD, mild intermittent, MARIELOS presenting for anxiety rx follow up. Currently on sertraline 100 mg - tolerating well; notes she is in a very good place right now. Notes sx well controlled, does not desire taper at this time, denies any panic attacks       Review of Systems   Constitutional:  Negative for chills, fever and unexpected weight change.   HENT:  Negative for trouble swallowing.    Eyes:  Negative for visual disturbance.   Respiratory:  Negative for shortness of breath.    Cardiovascular:  Negative for chest pain, palpitations and leg swelling.   Gastrointestinal:  Negative for abdominal pain.   Genitourinary:  Negative for difficulty urinating.   Musculoskeletal:  Negative for neck stiffness.   Skin:  Negative for color change and rash.   Neurological:  Negative for light-headedness.   Psychiatric/Behavioral:  The patient is not nervous/anxious.    All other systems reviewed and are negative.      Objective   /70 (BP Location: Right arm, Patient Position: Sitting, Cuff Size: Standard)   Pulse (!) 107   Temp 97.8 °F (36.6 °C) (Tympanic)   Resp 16   Ht 5' 6.75\" (1.695 m)   Wt 58.1 kg (128 lb)   LMP 2025   SpO2 97%   BMI 20.20 kg/m²      Physical " Exam  Vitals and nursing note reviewed.   Constitutional:       General: She is not in acute distress.     Appearance: She is well-developed.   HENT:      Head: Normocephalic and atraumatic.   Eyes:      Extraocular Movements: Extraocular movements intact.      Conjunctiva/sclera: Conjunctivae normal.   Cardiovascular:      Rate and Rhythm: Normal rate and regular rhythm.      Pulses: Normal pulses.      Heart sounds: Normal heart sounds. No murmur heard.     Comments: HR 90 BPM on exam   Pulmonary:      Effort: Pulmonary effort is normal. No respiratory distress.      Breath sounds: Normal breath sounds. No wheezing or rales.   Abdominal:      General: Bowel sounds are normal.      Palpations: Abdomen is soft.      Tenderness: There is no abdominal tenderness.   Musculoskeletal:         General: No swelling. Normal range of motion.      Cervical back: Normal range of motion and neck supple. No tenderness.      Right lower leg: No edema.      Left lower leg: No edema.   Lymphadenopathy:      Cervical: No cervical adenopathy.   Skin:     General: Skin is warm and dry.      Capillary Refill: Capillary refill takes less than 2 seconds.   Neurological:      General: No focal deficit present.      Mental Status: She is alert.   Psychiatric:         Mood and Affect: Mood normal.       Administrative Statements   I have spent a total time of 20 minutes in caring for this patient on the day of the visit/encounter including Prognosis, Risks and benefits of tx options, Instructions for management, Patient and family education, Importance of tx compliance, Risk factor reductions, Impressions, Counseling / Coordination of care, Documenting in the medical record, and Reviewing/placing orders in the medical record (including tests, medications, and/or procedures).

## 2025-03-07 NOTE — ASSESSMENT & PLAN NOTE
Currently on sertraline 100 mg - tolerating well;  Notes sx well controlled, does not desire taper at this time, denies any panic attacks    Refills placed; return precautions discussed    Orders:    sertraline (ZOLOFT) 100 mg tablet; Take 1 tablet (100 mg total) by mouth daily

## 2025-05-19 ENCOUNTER — CLINICAL SUPPORT (OUTPATIENT)
Dept: FAMILY MEDICINE CLINIC | Facility: CLINIC | Age: 20
End: 2025-05-19
Payer: COMMERCIAL

## 2025-05-19 DIAGNOSIS — Z11.1 ENCOUNTER FOR PPD TEST: Primary | ICD-10-CM

## 2025-05-19 PROCEDURE — 86580 TB INTRADERMAL TEST: CPT

## 2025-05-21 ENCOUNTER — CLINICAL SUPPORT (OUTPATIENT)
Dept: FAMILY MEDICINE CLINIC | Facility: CLINIC | Age: 20
End: 2025-05-21

## 2025-05-21 DIAGNOSIS — Z11.1 ENCOUNTER FOR PPD SKIN TEST READING: Primary | ICD-10-CM

## 2025-05-21 LAB
INDURATION: 0 MM
TB SKIN TEST: NEGATIVE

## 2025-05-21 PROCEDURE — NURSE
